# Patient Record
Sex: FEMALE | Race: WHITE | NOT HISPANIC OR LATINO | URBAN - METROPOLITAN AREA
[De-identification: names, ages, dates, MRNs, and addresses within clinical notes are randomized per-mention and may not be internally consistent; named-entity substitution may affect disease eponyms.]

---

## 2018-10-24 ENCOUNTER — APPOINTMENT (RX ONLY)
Dept: URBAN - METROPOLITAN AREA CLINIC 348 | Facility: CLINIC | Age: 63
Setting detail: DERMATOLOGY
End: 2018-10-24

## 2018-10-24 DIAGNOSIS — L82.1 OTHER SEBORRHEIC KERATOSIS: ICD-10-CM

## 2018-10-24 DIAGNOSIS — Z85.828 PERSONAL HISTORY OF OTHER MALIGNANT NEOPLASM OF SKIN: ICD-10-CM | Status: RESOLVED

## 2018-10-24 DIAGNOSIS — L81.4 OTHER MELANIN HYPERPIGMENTATION: ICD-10-CM

## 2018-10-24 PROBLEM — I10 ESSENTIAL (PRIMARY) HYPERTENSION: Status: ACTIVE | Noted: 2018-10-24

## 2018-10-24 PROCEDURE — ? COUNSELING

## 2018-10-24 PROCEDURE — ? OTHER

## 2018-10-24 PROCEDURE — 99203 OFFICE O/P NEW LOW 30 MIN: CPT

## 2018-10-24 ASSESSMENT — LOCATION SIMPLE DESCRIPTION DERM
LOCATION SIMPLE: UPPER BACK
LOCATION SIMPLE: RIGHT UPPER BACK
LOCATION SIMPLE: NOSE

## 2018-10-24 ASSESSMENT — LOCATION DETAILED DESCRIPTION DERM
LOCATION DETAILED: INFERIOR THORACIC SPINE
LOCATION DETAILED: RIGHT SUPERIOR UPPER BACK
LOCATION DETAILED: RIGHT NASAL DORSUM

## 2018-10-24 ASSESSMENT — LOCATION ZONE DERM
LOCATION ZONE: NOSE
LOCATION ZONE: TRUNK

## 2018-10-24 NOTE — PROCEDURE: MIPS QUALITY
Quality 130: Documentation Of Current Medications In The Medical Record: Current Medications Documented
Quality 131: Pain Assessment And Follow-Up: Pain assessment using a standardized tool is documented as negative, no follow-up plan required
Quality 226: Preventive Care And Screening: Tobacco Use: Screening And Cessation Intervention: Patient screened for tobacco and never smoked
Quality 143: Oncology: Medical And Radiation- Pain Intensity Quantified: Pain severity quantified, no pain present
Quality 110: Preventive Care And Screening: Influenza Immunization: Influenza Immunization previously received during influenza season
Detail Level: Detailed

## 2018-10-24 NOTE — PROCEDURE: OTHER
Other (Free Text): [facility] - [facility]; R NASAL SIDEWALL; MOHS 2015 DR. CABRERA
Detail Level: Detailed
Note Text (......Xxx Chief Complaint.): This diagnosis correlates with the

## 2020-06-15 ENCOUNTER — APPOINTMENT (RX ONLY)
Dept: URBAN - METROPOLITAN AREA CLINIC 348 | Facility: CLINIC | Age: 65
Setting detail: DERMATOLOGY
End: 2020-06-15

## 2020-06-15 DIAGNOSIS — L81.4 OTHER MELANIN HYPERPIGMENTATION: ICD-10-CM

## 2020-06-15 DIAGNOSIS — D18.0 HEMANGIOMA: ICD-10-CM

## 2020-06-15 DIAGNOSIS — Z85.828 PERSONAL HISTORY OF OTHER MALIGNANT NEOPLASM OF SKIN: ICD-10-CM

## 2020-06-15 DIAGNOSIS — L82.1 OTHER SEBORRHEIC KERATOSIS: ICD-10-CM

## 2020-06-15 DIAGNOSIS — D22 MELANOCYTIC NEVI: ICD-10-CM

## 2020-06-15 DIAGNOSIS — T07XXXA INSECT BITE, NONVENOMOUS, OF OTHER, MULTIPLE, AND UNSPECIFIED SITES, WITHOUT MENTION OF INFECTION: ICD-10-CM

## 2020-06-15 PROBLEM — D22.5 MELANOCYTIC NEVI OF TRUNK: Status: ACTIVE | Noted: 2020-06-15

## 2020-06-15 PROBLEM — D18.01 HEMANGIOMA OF SKIN AND SUBCUTANEOUS TISSUE: Status: ACTIVE | Noted: 2020-06-15

## 2020-06-15 PROBLEM — S20.369A INSECT BITE (NONVENOMOUS) OF UNSPECIFIED FRONT WALL OF THORAX, INITIAL ENCOUNTER: Status: ACTIVE | Noted: 2020-06-15

## 2020-06-15 PROCEDURE — ? COUNSELING

## 2020-06-15 PROCEDURE — 99214 OFFICE O/P EST MOD 30 MIN: CPT

## 2020-06-15 PROCEDURE — ? OTHER

## 2020-06-15 ASSESSMENT — LOCATION ZONE DERM
LOCATION ZONE: NOSE
LOCATION ZONE: TRUNK

## 2020-06-15 ASSESSMENT — LOCATION SIMPLE DESCRIPTION DERM
LOCATION SIMPLE: NOSE
LOCATION SIMPLE: RIGHT UPPER BACK
LOCATION SIMPLE: ABDOMEN
LOCATION SIMPLE: CHEST
LOCATION SIMPLE: UPPER BACK
LOCATION SIMPLE: LEFT UPPER BACK

## 2020-06-15 ASSESSMENT — LOCATION DETAILED DESCRIPTION DERM
LOCATION DETAILED: SUPERIOR THORACIC SPINE
LOCATION DETAILED: LEFT MEDIAL SUPERIOR CHEST
LOCATION DETAILED: PERIUMBILICAL SKIN
LOCATION DETAILED: LEFT MEDIAL UPPER BACK
LOCATION DETAILED: RIGHT NASAL DORSUM
LOCATION DETAILED: RIGHT MID-UPPER BACK

## 2020-06-15 NOTE — PROCEDURE: OTHER
Detail Level: Detailed
Other (Free Text): [facility] - NER; R NASAL SIDEWALL; MOHS 2015 DR. CABRERA
Note Text (......Xxx Chief Complaint.): This diagnosis correlates with the

## 2021-07-19 ENCOUNTER — APPOINTMENT (RX ONLY)
Dept: URBAN - METROPOLITAN AREA CLINIC 348 | Facility: CLINIC | Age: 66
Setting detail: DERMATOLOGY
End: 2021-07-19

## 2021-07-19 DIAGNOSIS — D22 MELANOCYTIC NEVI: ICD-10-CM

## 2021-07-19 DIAGNOSIS — Z85.828 PERSONAL HISTORY OF OTHER MALIGNANT NEOPLASM OF SKIN: ICD-10-CM

## 2021-07-19 DIAGNOSIS — D18.0 HEMANGIOMA: ICD-10-CM

## 2021-07-19 DIAGNOSIS — L81.4 OTHER MELANIN HYPERPIGMENTATION: ICD-10-CM

## 2021-07-19 DIAGNOSIS — L72.0 EPIDERMAL CYST: ICD-10-CM

## 2021-07-19 PROBLEM — L29.8 OTHER PRURITUS: Status: ACTIVE | Noted: 2021-07-19

## 2021-07-19 PROBLEM — D22.5 MELANOCYTIC NEVI OF TRUNK: Status: ACTIVE | Noted: 2021-07-19

## 2021-07-19 PROBLEM — D18.01 HEMANGIOMA OF SKIN AND SUBCUTANEOUS TISSUE: Status: ACTIVE | Noted: 2021-07-19

## 2021-07-19 PROCEDURE — ? ACNE SURGERY

## 2021-07-19 PROCEDURE — ? OTHER

## 2021-07-19 PROCEDURE — 10040 EXTRACTION: CPT

## 2021-07-19 PROCEDURE — 99213 OFFICE O/P EST LOW 20 MIN: CPT | Mod: 25

## 2021-07-19 PROCEDURE — ? COUNSELING

## 2021-07-19 ASSESSMENT — LOCATION DETAILED DESCRIPTION DERM
LOCATION DETAILED: LEFT MEDIAL UPPER BACK
LOCATION DETAILED: PERIUMBILICAL SKIN
LOCATION DETAILED: RIGHT NASAL DORSUM
LOCATION DETAILED: SUPERIOR THORACIC SPINE
LOCATION DETAILED: LEFT LATERAL SUPERIOR EYELID

## 2021-07-19 ASSESSMENT — LOCATION ZONE DERM
LOCATION ZONE: NOSE
LOCATION ZONE: EYELID
LOCATION ZONE: TRUNK

## 2021-07-19 ASSESSMENT — LOCATION SIMPLE DESCRIPTION DERM
LOCATION SIMPLE: LEFT UPPER BACK
LOCATION SIMPLE: LEFT SUPERIOR EYELID
LOCATION SIMPLE: ABDOMEN
LOCATION SIMPLE: UPPER BACK
LOCATION SIMPLE: NOSE

## 2021-07-19 NOTE — PROCEDURE: ACNE SURGERY
Post-Care Instructions: I reviewed with the patient in detail post-care instructions. Patient is to keep the treatment areas dry overnight, and then apply bacitracin twice daily until healed. Patient may apply hydrogen peroxide soaks to remove any crusting.
Detail Level: Detailed
Prep Text (Optional): Prior to removal the treatment areas were prepped in the usual fashion.
Render Number Of Lesions Treated: no
Acne Type: Milial cysts
Consent was obtained and risks were reviewed including but not limited to scarring, infection, bleeding, scabbing, incomplete removal, and allergy to anesthesia.
Extraction Method: electrocautery

## 2022-02-16 ENCOUNTER — APPOINTMENT (RX ONLY)
Dept: URBAN - METROPOLITAN AREA CLINIC 341 | Facility: CLINIC | Age: 67
Setting detail: DERMATOLOGY
End: 2022-02-16

## 2022-02-16 DIAGNOSIS — L71.8 OTHER ROSACEA: ICD-10-CM | Status: INADEQUATELY CONTROLLED

## 2022-02-16 PROBLEM — D23.39 OTHER BENIGN NEOPLASM OF SKIN OF OTHER PARTS OF FACE: Status: ACTIVE | Noted: 2022-02-16

## 2022-02-16 PROCEDURE — ? PRESCRIPTION

## 2022-02-16 PROCEDURE — ? PRESCRIPTION MEDICATION MANAGEMENT

## 2022-02-16 PROCEDURE — 99214 OFFICE O/P EST MOD 30 MIN: CPT

## 2022-02-16 PROCEDURE — ? COUNSELING

## 2022-02-16 PROCEDURE — ? REFERRAL

## 2022-02-16 RX ORDER — DOXYCYCLINE 40 MG/1
CAPSULE ORAL ONCE DAILY
Qty: 30 | Refills: 2 | Status: ERX | COMMUNITY
Start: 2022-02-16

## 2022-02-16 RX ORDER — IVERMECTIN 10 MG/G
CREAM TOPICAL
Qty: 45 | Refills: 3 | Status: ERX | COMMUNITY
Start: 2022-02-16

## 2022-02-16 RX ADMIN — IVERMECTIN: 10 CREAM TOPICAL at 00:00

## 2022-02-16 RX ADMIN — DOXYCYCLINE: 40 CAPSULE ORAL at 00:00

## 2022-02-16 ASSESSMENT — LOCATION DETAILED DESCRIPTION DERM
LOCATION DETAILED: RIGHT INFERIOR CENTRAL MALAR CHEEK
LOCATION DETAILED: LEFT SUPERIOR CENTRAL MALAR CHEEK
LOCATION DETAILED: LEFT CENTRAL MALAR CHEEK
LOCATION DETAILED: NASAL SUPRATIP
LOCATION DETAILED: RIGHT SUPERIOR MEDIAL MALAR CHEEK

## 2022-02-16 ASSESSMENT — LOCATION SIMPLE DESCRIPTION DERM
LOCATION SIMPLE: LEFT CHEEK
LOCATION SIMPLE: RIGHT CHEEK
LOCATION SIMPLE: NOSE

## 2022-02-16 ASSESSMENT — LOCATION ZONE DERM
LOCATION ZONE: FACE
LOCATION ZONE: NOSE

## 2022-02-16 NOTE — PROCEDURE: PRESCRIPTION MEDICATION MANAGEMENT
Render In Strict Bullet Format?: No
Detail Level: Zone
Initiate Treatment: Oracea one po qd \\nSolantra one po qd
Continue Regimen: Dove sensitive / alternate w/ glycerin soap

## 2022-03-23 ENCOUNTER — APPOINTMENT (RX ONLY)
Dept: URBAN - METROPOLITAN AREA CLINIC 341 | Facility: CLINIC | Age: 67
Setting detail: DERMATOLOGY
End: 2022-03-23

## 2022-03-23 ENCOUNTER — RX ONLY (OUTPATIENT)
Age: 67
Setting detail: RX ONLY
End: 2022-03-23

## 2022-03-23 DIAGNOSIS — L71.8 OTHER ROSACEA: ICD-10-CM | Status: INADEQUATELY CONTROLLED

## 2022-03-23 PROCEDURE — ? PRESCRIPTION MEDICATION MANAGEMENT

## 2022-03-23 PROCEDURE — ? COUNSELING

## 2022-03-23 PROCEDURE — ? TREATMENT REGIMEN

## 2022-03-23 PROCEDURE — 99214 OFFICE O/P EST MOD 30 MIN: CPT

## 2022-03-23 RX ORDER — DOXYCYCLINE 40 MG/1
CAPSULE ORAL ONCE DAILY
Qty: 30 | Refills: 2 | Status: ERX

## 2022-03-23 ASSESSMENT — LOCATION DETAILED DESCRIPTION DERM
LOCATION DETAILED: RIGHT INFERIOR CENTRAL MALAR CHEEK
LOCATION DETAILED: LEFT CENTRAL MALAR CHEEK
LOCATION DETAILED: NASAL SUPRATIP

## 2022-03-23 ASSESSMENT — LOCATION SIMPLE DESCRIPTION DERM
LOCATION SIMPLE: RIGHT CHEEK
LOCATION SIMPLE: LEFT CHEEK
LOCATION SIMPLE: NOSE

## 2022-03-23 ASSESSMENT — LOCATION ZONE DERM
LOCATION ZONE: NOSE
LOCATION ZONE: FACE

## 2022-03-23 NOTE — PROCEDURE: PRESCRIPTION MEDICATION MANAGEMENT
Plan: Patient has only been using RX therapy for 1 month. Reviewed mechanisms of action for both prescriptions and encouraged patient to continue use given some improvement. Patient verbalized understanding and established goal of treatment as: reduction in overall redness and resolution of acne.
Render In Strict Bullet Format?: No
Detail Level: Zone
Continue Regimen: Oracea  40mg one po qd \\n\\nSolantra apply full face daily after cleansing

## 2022-05-05 ENCOUNTER — RX ONLY (OUTPATIENT)
Age: 67
Setting detail: RX ONLY
End: 2022-05-05

## 2022-05-05 RX ORDER — DOXYCYCLINE HYCLATE 20 MG/1
TABLET, FILM COATED ORAL
Qty: 60 | Refills: 1 | Status: ERX | COMMUNITY
Start: 2022-05-05

## 2022-09-01 ENCOUNTER — APPOINTMENT (RX ONLY)
Dept: URBAN - METROPOLITAN AREA CLINIC 341 | Facility: CLINIC | Age: 67
Setting detail: DERMATOLOGY
End: 2022-09-01

## 2022-09-01 DIAGNOSIS — L71.8 OTHER ROSACEA: ICD-10-CM | Status: IMPROVED

## 2022-09-01 DIAGNOSIS — L82.0 INFLAMED SEBORRHEIC KERATOSIS: ICD-10-CM | Status: INADEQUATELY CONTROLLED

## 2022-09-01 DIAGNOSIS — L82.1 OTHER SEBORRHEIC KERATOSIS: ICD-10-CM | Status: STABLE

## 2022-09-01 PROBLEM — D23.39 OTHER BENIGN NEOPLASM OF SKIN OF OTHER PARTS OF FACE: Status: ACTIVE | Noted: 2022-09-01

## 2022-09-01 PROCEDURE — ? DEFER

## 2022-09-01 PROCEDURE — ? LIQUID NITROGEN

## 2022-09-01 PROCEDURE — ? PRESCRIPTION

## 2022-09-01 PROCEDURE — 99214 OFFICE O/P EST MOD 30 MIN: CPT | Mod: 25

## 2022-09-01 PROCEDURE — ? PRESCRIPTION MEDICATION MANAGEMENT

## 2022-09-01 PROCEDURE — ? COUNSELING

## 2022-09-01 PROCEDURE — ? TREATMENT REGIMEN

## 2022-09-01 PROCEDURE — 17110 DESTRUCTION B9 LES UP TO 14: CPT

## 2022-09-01 RX ORDER — IVERMECTIN 10 MG/G
CREAM TOPICAL
Qty: 45 | Refills: 3 | Status: ERX

## 2022-09-01 RX ORDER — DOXYCYCLINE 40 MG/1
CAPSULE ORAL QD
Qty: 90 | Refills: 5 | Status: ERX

## 2022-09-01 ASSESSMENT — LOCATION DETAILED DESCRIPTION DERM
LOCATION DETAILED: LEFT CENTRAL MALAR CHEEK
LOCATION DETAILED: RIGHT MEDIAL UPPER BACK
LOCATION DETAILED: RIGHT INFERIOR CENTRAL MALAR CHEEK
LOCATION DETAILED: LEFT LATERAL ABDOMEN
LOCATION DETAILED: NASAL SUPRATIP

## 2022-09-01 ASSESSMENT — LOCATION SIMPLE DESCRIPTION DERM
LOCATION SIMPLE: ABDOMEN
LOCATION SIMPLE: RIGHT UPPER BACK
LOCATION SIMPLE: LEFT CHEEK
LOCATION SIMPLE: RIGHT CHEEK
LOCATION SIMPLE: NOSE

## 2022-09-01 ASSESSMENT — LOCATION ZONE DERM
LOCATION ZONE: NOSE
LOCATION ZONE: TRUNK
LOCATION ZONE: FACE

## 2022-09-01 NOTE — PROCEDURE: LIQUID NITROGEN
Show Aperture Variable?: Yes
Detail Level: Detailed
Render Note In Bullet Format When Appropriate: No
Post-Care Instructions: I reviewed with the patient in detail post-care instructions. Patient is to wear sunprotection, and avoid picking at any of the treated lesions. Pt may apply Vaseline to crusted or scabbing areas.
Spray Paint Text: The liquid nitrogen was applied to the skin utilizing a spray paint frosting technique.
Medical Necessity Information: It is in your best interest to select a reason for this procedure from the list below. All of these items fulfill various CMS LCD requirements except the new and changing color options.
Medical Necessity Clause: This procedure was medically necessary because the lesions that were treated were:
Consent: The patient's consent was obtained including but not limited to risks of crusting, scabbing, blistering, scarring, darker or lighter pigmentary change, recurrence, incomplete removal and infection.

## 2022-09-01 NOTE — PROCEDURE: PRESCRIPTION MEDICATION MANAGEMENT
Render In Strict Bullet Format?: No
Detail Level: Zone
Initiate Treatment: Restart - Oracea - one pill once daily with food
Continue Regimen: Solantra apply full face daily after cleansing

## 2022-09-01 NOTE — PROCEDURE: DEFER
Detail Level: Detailed
Introduction Text (Please End With A Colon): The following procedure was deferred:
Size Of Lesion In Cm (Optional): 0

## 2022-09-09 ENCOUNTER — RX ONLY (OUTPATIENT)
Age: 67
Setting detail: RX ONLY
End: 2022-09-09

## 2022-09-09 RX ORDER — METRONIDAZOLE 7.5 MG/G
GEL TOPICAL
Qty: 45 | Refills: 0 | Status: ERX | COMMUNITY
Start: 2022-09-09

## 2023-09-11 ENCOUNTER — APPOINTMENT (RX ONLY)
Dept: URBAN - METROPOLITAN AREA CLINIC 341 | Facility: CLINIC | Age: 68
Setting detail: DERMATOLOGY
End: 2023-09-11

## 2023-09-11 DIAGNOSIS — L73.8 OTHER SPECIFIED FOLLICULAR DISORDERS: ICD-10-CM

## 2023-09-11 DIAGNOSIS — D22 MELANOCYTIC NEVI: ICD-10-CM

## 2023-09-11 DIAGNOSIS — L71.8 OTHER ROSACEA: ICD-10-CM | Status: INADEQUATELY CONTROLLED

## 2023-09-11 DIAGNOSIS — L81.5 LEUKODERMA, NOT ELSEWHERE CLASSIFIED: ICD-10-CM

## 2023-09-11 DIAGNOSIS — Z85.828 PERSONAL HISTORY OF OTHER MALIGNANT NEOPLASM OF SKIN: ICD-10-CM

## 2023-09-11 DIAGNOSIS — L81.4 OTHER MELANIN HYPERPIGMENTATION: ICD-10-CM

## 2023-09-11 DIAGNOSIS — I78.8 OTHER DISEASES OF CAPILLARIES: ICD-10-CM

## 2023-09-11 DIAGNOSIS — D18.0 HEMANGIOMA: ICD-10-CM

## 2023-09-11 PROBLEM — D18.01 HEMANGIOMA OF SKIN AND SUBCUTANEOUS TISSUE: Status: ACTIVE | Noted: 2023-09-11

## 2023-09-11 PROBLEM — D22.5 MELANOCYTIC NEVI OF TRUNK: Status: ACTIVE | Noted: 2023-09-11

## 2023-09-11 PROCEDURE — ? COUNSELING

## 2023-09-11 PROCEDURE — ? PRESCRIPTION

## 2023-09-11 PROCEDURE — ? TREATMENT REGIMEN

## 2023-09-11 PROCEDURE — 99214 OFFICE O/P EST MOD 30 MIN: CPT

## 2023-09-11 PROCEDURE — ? OTHER

## 2023-09-11 PROCEDURE — ? PRESCRIPTION MEDICATION MANAGEMENT

## 2023-09-11 RX ORDER — IVERMECTIN 10 MG/G
CREAM TOPICAL
Qty: 45 | Refills: 3 | Status: ERX | COMMUNITY
Start: 2023-09-11

## 2023-09-11 RX ADMIN — IVERMECTIN: 10 CREAM TOPICAL at 00:00

## 2023-09-11 ASSESSMENT — LOCATION DETAILED DESCRIPTION DERM
LOCATION DETAILED: NASAL DORSUM
LOCATION DETAILED: RIGHT INFERIOR CENTRAL MALAR CHEEK
LOCATION DETAILED: PERIUMBILICAL SKIN
LOCATION DETAILED: LEFT CENTRAL MALAR CHEEK
LOCATION DETAILED: RIGHT NASAL DORSUM
LOCATION DETAILED: SUPERIOR MID FOREHEAD
LOCATION DETAILED: INFERIOR MID FOREHEAD
LOCATION DETAILED: LEFT PROXIMAL PRETIBIAL REGION
LOCATION DETAILED: SUPERIOR THORACIC SPINE
LOCATION DETAILED: LEFT MEDIAL UPPER BACK
LOCATION DETAILED: NASAL SUPRATIP
LOCATION DETAILED: RIGHT PROXIMAL PRETIBIAL REGION

## 2023-09-11 ASSESSMENT — LOCATION ZONE DERM
LOCATION ZONE: NOSE
LOCATION ZONE: LEG
LOCATION ZONE: TRUNK
LOCATION ZONE: FACE

## 2023-09-11 ASSESSMENT — LOCATION SIMPLE DESCRIPTION DERM
LOCATION SIMPLE: RIGHT CHEEK
LOCATION SIMPLE: SUPERIOR FOREHEAD
LOCATION SIMPLE: UPPER BACK
LOCATION SIMPLE: NOSE
LOCATION SIMPLE: ABDOMEN
LOCATION SIMPLE: LEFT CHEEK
LOCATION SIMPLE: LEFT UPPER BACK
LOCATION SIMPLE: RIGHT PRETIBIAL REGION
LOCATION SIMPLE: INFERIOR FOREHEAD
LOCATION SIMPLE: LEFT PRETIBIAL REGION

## 2023-09-11 NOTE — PROCEDURE: PRESCRIPTION MEDICATION MANAGEMENT
Render In Strict Bullet Format?: No
Detail Level: Zone
Continue Regimen: Ivermectin apply to,the full face daily. ( [facility] card given )

## 2023-09-11 NOTE — PROCEDURE: OTHER
Detail Level: Detailed
Other (Free Text): [facility] - NER; R NASAL SIDEWALL; MOHS 2015 DR. CABRERA
Note Text (......Xxx Chief Complaint.): This diagnosis correlates with the
Other (Free Text): Referred to  for  Laser Consultation
Render Risk Assessment In Note?: no
Detail Level: Simple

## 2023-09-27 ENCOUNTER — RX ONLY (OUTPATIENT)
Age: 68
Setting detail: RX ONLY
End: 2023-09-27

## 2023-09-27 RX ORDER — METRONIDAZOLE 7.5 MG/G
GEL TOPICAL
Qty: 45 | Refills: 0 | Status: ERX

## 2025-07-09 ENCOUNTER — NEW PATIENT COMPREHENSIVE (OUTPATIENT)
Dept: URBAN - METROPOLITAN AREA CLINIC 22 | Facility: CLINIC | Age: 70
End: 2025-07-09

## 2025-07-09 DIAGNOSIS — H04.123: ICD-10-CM

## 2025-07-09 DIAGNOSIS — H25.13: ICD-10-CM

## 2025-07-09 DIAGNOSIS — E11.9: ICD-10-CM

## 2025-07-09 PROCEDURE — 92004 COMPRE OPH EXAM NEW PT 1/>: CPT

## 2025-07-09 ASSESSMENT — VISUAL ACUITY
OD_SC: 20/60+2
OD_PH: 20/30-2
OS_SC: 20/20-1

## 2025-07-09 ASSESSMENT — TONOMETRY
OS_IOP_MMHG: 15
OD_IOP_MMHG: 14

## 2025-09-03 ENCOUNTER — ESTABLISHED COMPREHENSIVE EXAM (OUTPATIENT)
Dept: URBAN - METROPOLITAN AREA CLINIC 59 | Facility: CLINIC | Age: 70
End: 2025-09-03

## 2025-09-03 DIAGNOSIS — E11.9: ICD-10-CM

## 2025-09-03 DIAGNOSIS — H25.13: ICD-10-CM

## 2025-09-03 DIAGNOSIS — H04.123: ICD-10-CM

## 2025-09-03 DIAGNOSIS — L71.9: ICD-10-CM

## 2025-09-03 DIAGNOSIS — H10.13: ICD-10-CM

## 2025-09-03 PROCEDURE — 99214 OFFICE O/P EST MOD 30 MIN: CPT

## 2025-09-03 ASSESSMENT — VISUAL ACUITY
OS_SC: 20/20-1
OD_SC: 20/50

## 2025-09-03 ASSESSMENT — TONOMETRY
OS_IOP_MMHG: 15
OD_IOP_MMHG: 15